# Patient Record
Sex: MALE | Race: WHITE | NOT HISPANIC OR LATINO | Employment: FULL TIME | URBAN - METROPOLITAN AREA
[De-identification: names, ages, dates, MRNs, and addresses within clinical notes are randomized per-mention and may not be internally consistent; named-entity substitution may affect disease eponyms.]

---

## 2018-06-04 ENCOUNTER — OFFICE VISIT (OUTPATIENT)
Dept: SLEEP CENTER | Facility: CLINIC | Age: 43
End: 2018-06-04
Payer: COMMERCIAL

## 2018-06-04 VITALS
SYSTOLIC BLOOD PRESSURE: 142 MMHG | HEIGHT: 72 IN | DIASTOLIC BLOOD PRESSURE: 80 MMHG | BODY MASS INDEX: 34.08 KG/M2 | WEIGHT: 251.6 LBS | HEART RATE: 70 BPM

## 2018-06-04 DIAGNOSIS — Z99.89 OBSTRUCTIVE SLEEP APNEA ON CPAP: Primary | ICD-10-CM

## 2018-06-04 DIAGNOSIS — E66.09 CLASS 1 OBESITY DUE TO EXCESS CALORIES WITH BODY MASS INDEX (BMI) OF 34.0 TO 34.9 IN ADULT, UNSPECIFIED WHETHER SERIOUS COMORBIDITY PRESENT: ICD-10-CM

## 2018-06-04 DIAGNOSIS — G47.33 OBSTRUCTIVE SLEEP APNEA ON CPAP: Primary | ICD-10-CM

## 2018-06-04 DIAGNOSIS — G47.26 SHIFT WORK SLEEP DISORDER: ICD-10-CM

## 2018-06-04 PROBLEM — E66.811 CLASS 1 OBESITY DUE TO EXCESS CALORIES WITH BODY MASS INDEX (BMI) OF 34.0 TO 34.9 IN ADULT: Status: ACTIVE | Noted: 2018-06-04

## 2018-06-04 PROCEDURE — 99214 OFFICE O/P EST MOD 30 MIN: CPT | Performed by: NURSE PRACTITIONER

## 2018-06-04 NOTE — PATIENT INSTRUCTIONS
1   Continue use of CPAP equipment nightly  2  Continue to clean your equipment as discussed  3  Contact the [facility] with any questions or concerns prior to her next visit as needed  4    Schedule visit for follow-up in 1 year

## 2018-06-04 NOTE — PROGRESS NOTES
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  His current schedule is night shift , however he recently accepted a new position on a strictly day shift schedule  Current Outpatient Prescriptions:     Cetirizine HCl (ZYRTEC ALLERGY PO), Take by mouth daily, Disp: , Rfl:     [place] Sleepiness Scale  Sitting and reading: Slight chance of dozing  Watching TV: Slight chance of dozing  Sitting, inactive in a public place (e g  a theatre or a meeting): Would never doze  As a passenger in a car for an hour without a break: Would never doze  Lying down to rest in the afternoon when circumstances permit: Moderate chance of dozing  Sitting and talking to someone: Would never doze  Sitting quietly after a lunch without alcohol: Slight chance of dozing  In a car, while stopped for a few minutes in traffic: Would never doze  Total score: 5              Vitals:    18 1100   BP: 142/80   Pulse: 70   Weight: 114 kg (251 lb 9 6 oz)   Height: 6' (1 829 m)       Body mass index is 34 12 kg/m²  Neck Circumference: 40 (cm)       EPWORTH SLEEPINESS SCORE  Total score: 5      Past History Since Last Sleep Center Visit:     He denies any changes to his health since his last visit  He is currently working straight night shift between the hours of 7pm-7am Wednesday through Saturday    He still finds it difficult on the transition days  He has recently accepted a new job position and will be working M-F dayshift  The review of systems and following portions of the patient's history were reviewed and updated as appropriate: allergies, current medications, past family history, past medical history, past social history, past surgical history, and problem list     OBJECTIVE    PAP Pressure: Nasal CPAP set to deliver 17 cm of water pressure  DME Provider: [employer] [facility]    Physical Exam:     General Appearance:   Alert, cooperative, no distress, appears stated age, obese     Head:   Normocephalic, without obvious abnormality, atraumatic     Eyes:   PERRL, conjunctiva/corneas clear, EOM's intact          Nose:  Nares normal, septum midline, no drainage or sinus tenderness           Throat:  Lips, teeth and gums normal; tongue normal size and  shape and midline mucosa moist and redundant bilaterally, uvula thick and normal in length, tonsils not visualized, Mallampati class 4       Neck:  Supple, symmetrical, trachea midline, no adenopathy; Thyroid: No enlargement, tenderness or nodules; no carotid bruit or JVD     Lungs:      Clear to auscultation bilaterally, respirations unlabored     Heart:   Regular rate and rhythm, S1 and S2 normal, no murmur, rub or gallop       Extremities:  Extremities normal, atraumatic, no cyanosis or edema     Pulses:  2+ and symmetric all extremities     Skin:  Skin color, texture, turgor normal, no rashes or lesions       Neurologic:  CNII-XII intact  Normal strength, sensation throughout       ASSESSMENT / PLAN    1  Obstructive sleep apnea on CPAP  PAP DME Resupply/Reorder   2  Shift work sleep disorder     3  Class 1 obesity due to excess calories with body mass index (BMI) of 34 0 to 34 9 in adult, unspecified whether serious comorbidity present             Counseling / Coordination of Care  Total clinic time spent today 25 minutes   Greater than 50% of total time was spent with the patient and / or family counseling and / or coordination of care  A description of the counseling / coordination of care:     Impressions, Prognosis, Instructions for management, Risks and benefits of treatment, Patient and family education, Risk factor reductions and Importance of compliance with treatment    There is not compliance data available due to the age of his equipment  We discussed that he would likely qualify for new equipment at this time, however, he does not want to receive any new equipment  Response to treatment has been good  He will continue using this equipment at the settings noted above for the next 12 months  At that timehe will then return for a routine follow-up evaluation  New supplies have been ordered or the next 12 months  I have asked him to contact the [facility] if he encounters any difficulties prior to that time  We discussed that lifestyle changes regarding diet and exercise would be helpful in improving his sleep apnea and overall health  He feels that with his new schedule, he may be better able to do this  We discussed that significant amounts of weight loss, such as 20-30 lbs, would likely decrease the amount of pressure he would need from his CPAP equipment  Pressure changes could be made, if needed  He will contact our office if he experiences this  The following instructions have been given to the patient today:    Patient Instructions   1  Continue use of CPAP equipment nightly  2  Continue to clean your equipment as discussed  3  Contact the [facility] with any questions or concerns prior to her next visit as needed  4    Schedule visit for follow-up in 1 year        Naveen Valentin, [address] [place] [address]

## 2019-06-05 ENCOUNTER — OFFICE VISIT (OUTPATIENT)
Dept: SLEEP CENTER | Facility: CLINIC | Age: 44
End: 2019-06-05
Payer: COMMERCIAL

## 2019-06-05 VITALS
HEART RATE: 64 BPM | WEIGHT: 281.4 LBS | HEIGHT: 72 IN | SYSTOLIC BLOOD PRESSURE: 146 MMHG | BODY MASS INDEX: 38.11 KG/M2 | DIASTOLIC BLOOD PRESSURE: 88 MMHG

## 2019-06-05 DIAGNOSIS — Z99.89 OBSTRUCTIVE SLEEP APNEA TREATED WITH CONTINUOUS POSITIVE AIRWAY PRESSURE (CPAP): Primary | ICD-10-CM

## 2019-06-05 DIAGNOSIS — G47.33 OBSTRUCTIVE SLEEP APNEA TREATED WITH CONTINUOUS POSITIVE AIRWAY PRESSURE (CPAP): Primary | ICD-10-CM

## 2019-06-05 PROBLEM — G47.26 SHIFT WORK SLEEP DISORDER: Status: RESOLVED | Noted: 2018-06-04 | Resolved: 2019-06-05

## 2019-06-05 PROCEDURE — 99214 OFFICE O/P EST MOD 30 MIN: CPT | Performed by: NURSE PRACTITIONER

## 2020-06-09 ENCOUNTER — OFFICE VISIT (OUTPATIENT)
Dept: SLEEP CENTER | Facility: CLINIC | Age: 45
End: 2020-06-09
Payer: COMMERCIAL

## 2020-06-09 VITALS
SYSTOLIC BLOOD PRESSURE: 130 MMHG | HEART RATE: 80 BPM | BODY MASS INDEX: 35.92 KG/M2 | WEIGHT: 265.2 LBS | HEIGHT: 72 IN | DIASTOLIC BLOOD PRESSURE: 76 MMHG

## 2020-06-09 DIAGNOSIS — G47.33 OBSTRUCTIVE SLEEP APNEA TREATED WITH CONTINUOUS POSITIVE AIRWAY PRESSURE (CPAP): Primary | ICD-10-CM

## 2020-06-09 DIAGNOSIS — Z99.89 OBSTRUCTIVE SLEEP APNEA TREATED WITH CONTINUOUS POSITIVE AIRWAY PRESSURE (CPAP): Primary | ICD-10-CM

## 2020-06-09 DIAGNOSIS — E66.09 CLASS 2 OBESITY DUE TO EXCESS CALORIES WITHOUT SERIOUS COMORBIDITY WITH BODY MASS INDEX (BMI) OF 35.0 TO 35.9 IN ADULT: ICD-10-CM

## 2020-06-09 DIAGNOSIS — F51.04 PSYCHOPHYSIOLOGICAL INSOMNIA: ICD-10-CM

## 2020-06-09 PROBLEM — E66.812 CLASS 2 OBESITY DUE TO EXCESS CALORIES WITH BODY MASS INDEX (BMI) OF 35.0 TO 35.9 IN ADULT: Status: ACTIVE | Noted: 2018-06-04

## 2020-06-09 PROCEDURE — 99214 OFFICE O/P EST MOD 30 MIN: CPT | Performed by: NURSE PRACTITIONER

## 2020-06-09 RX ORDER — BUPROPION HYDROCHLORIDE 150 MG/1
TABLET, EXTENDED RELEASE ORAL
COMMUNITY
Start: 2020-05-11

## 2020-06-09 RX ORDER — TRAZODONE HYDROCHLORIDE 50 MG/1
50 TABLET ORAL
Qty: 30 TABLET | Refills: 5 | Status: SHIPPED | OUTPATIENT
Start: 2020-06-09 | End: 2021-01-12 | Stop reason: SDUPTHER

## 2020-06-10 ENCOUNTER — TELEPHONE (OUTPATIENT)
Dept: SLEEP CENTER | Facility: CLINIC | Age: 45
End: 2020-06-10

## 2021-01-12 ENCOUNTER — OFFICE VISIT (OUTPATIENT)
Dept: SLEEP CENTER | Facility: CLINIC | Age: 46
End: 2021-01-12
Payer: COMMERCIAL

## 2021-01-12 VITALS
DIASTOLIC BLOOD PRESSURE: 80 MMHG | WEIGHT: 270.6 LBS | HEART RATE: 68 BPM | HEIGHT: 72 IN | BODY MASS INDEX: 36.65 KG/M2 | SYSTOLIC BLOOD PRESSURE: 122 MMHG

## 2021-01-12 DIAGNOSIS — Z99.89 OBSTRUCTIVE SLEEP APNEA TREATED WITH CONTINUOUS POSITIVE AIRWAY PRESSURE (CPAP): Primary | ICD-10-CM

## 2021-01-12 DIAGNOSIS — G47.33 OBSTRUCTIVE SLEEP APNEA TREATED WITH CONTINUOUS POSITIVE AIRWAY PRESSURE (CPAP): Primary | ICD-10-CM

## 2021-01-12 DIAGNOSIS — F51.04 PSYCHOPHYSIOLOGICAL INSOMNIA: ICD-10-CM

## 2021-01-12 PROCEDURE — 99214 OFFICE O/P EST MOD 30 MIN: CPT | Performed by: NURSE PRACTITIONER

## 2021-01-12 RX ORDER — LISINOPRIL AND HYDROCHLOROTHIAZIDE 12.5; 1 MG/1; MG/1
1 TABLET ORAL EVERY MORNING
COMMUNITY
Start: 2020-12-21

## 2021-01-12 RX ORDER — TRAZODONE HYDROCHLORIDE 50 MG/1
TABLET ORAL
Qty: 60 TABLET | Refills: 5 | Status: SHIPPED | OUTPATIENT
Start: 2021-01-12 | End: 2021-12-07

## 2021-01-12 RX ORDER — SILDENAFIL 50 MG/1
50 TABLET, FILM COATED ORAL DAILY
COMMUNITY
Start: 2020-10-28

## 2021-01-12 NOTE — PATIENT INSTRUCTIONS
1   Continue trazodone, may increase dose to 1 5-2 tablets, as needed  2  Continue use of CPAP equipment nightly  3  Continue to clean your equipment, as discussed  4  Contact the Sleep [address] [place] [address] with any questions or concerns prior to your next visit, as needed  5  Schedule visit for follow-up in 6 months      Nursing Support:  When: Monday through Friday 7A-5PM except holidays  Where: Our direct line is [phone]  If you are having a true emergency please call 911  In the event that the line is busy or it is after hours please leave a voice message and we will return your call  Please speak clearly, leaving your full name, birth date, best number to reach you and the reason for your call  Medication refills: We will need the name of the medication, the dosage, the ordering provider, whether you get a 30 or 90 day refill, and the pharmacy name and address  Medications will be ordered by the provider only  Nurses cannot call in prescriptions  Please allow 7 days for medication refills  Physician requested updates: If your provider requested that you call with an update after starting medication, please be ready to provide us the medication and dosage, what time you take your medication, the time you attempt to fall asleep, time you fall asleep, when you wake up, and what time you get out of bed  Sleep Study Results: We will contact you with sleep study results and/or next steps after the physician has reviewed your testing

## 2021-01-12 NOTE — PROGRESS NOTES
Progress Note - Isakramon Centeno 39 y o  male   :1975, MRN: [id number]  2021          Follow Up Evaluation / Problem:  Obstructive Sleep Apnea  Obesity    HPI: Ehsan Cat is a 39y o  year old male  He presents for follow up of obstructive sleep apnea  He was originally diagnosed in  and received his CPAP equipment at that time  He had a follow-up sleep study in  with an adjustment to his pressure  He is currently using his CPAP equipment nightly at 13 cm of water pressure  He sleeps well without frequent night time awakenings and feels refreshed upon awakening  He began the use of trazodone 50mg at bedtime, to help with sleep initiation insomnia  Review of Systems      Genitourinary none   Cardiology none   Gastrointestinal none   Neurology none   Constitutional none   Integumentary none   Psychiatry none   Musculoskeletal none   Pulmonary none   ENT none   Endocrine frequent urination   Hematological none         Current Outpatient Medications:     buPROPion (WELLBUTRIN SR) 150 mg 12 hr tablet, , Disp: , Rfl:     Cetirizine HCl (ZYRTEC ALLERGY PO), Take by mouth daily, Disp: , Rfl:     lisinopril-hydrochlorothiazide (PRINZIDE,ZESTORETIC) 10-12 5 MG per tablet, Take 1 tablet by mouth every morning, Disp: , Rfl:     sildenafil (VIAGRA) 50 MG tablet, Take 50 mg by mouth daily, Disp: , Rfl:     traZODone (DESYREL) 50 mg tablet, Take 1-2 tablets at bedtime for insomnia, Disp: 60 tablet, Rfl: 5    [place] Sleepiness Scale  Sitting and reading: Would never doze  Watching TV: Slight chance of dozing  Sitting, inactive in a public place (e g  a theatre or a meeting):  Would never doze  As a passenger in a car for an hour without a break: Would never doze  Lying down to rest in the afternoon when circumstances permit: Slight chance of dozing  Sitting and talking to someone: Would never doze  Sitting quietly after a lunch without alcohol: Would never doze  In a car, while stopped for a few minutes in traffic: Would never doze  Total score: 2              Vitals:    01/12/21 0739   BP: 122/80   Pulse: 68   Weight: 123 kg (270 lb 9 6 oz)   Height: 6' (1 829 m)       Body mass index is 36 7 kg/m²  EPWORTH SLEEPINESS SCORE  Total score: 2      Past History Since Last Sleep Center Visit:   He began the use of lisinopril to treat HTN, which is working well for him  He continues to have some difficulty with sleep initiation  Symptoms are more significant during the week, on work nights  He has been using trazodone 50mg  This helps approximately 50% of the time  On other nights, he may lie awake for up to an hour  He states that he uses his CPAP equipment nightly and feels he gets benefit from using it  He reports that he cleans his equipment appropriately and changes supplies regularly  He is not interested in replacing his CPAP equipment at this time  The review of systems and following portions of the patient's history were reviewed and updated as appropriate: allergies, current medications, past family history, past medical history, past social history, past surgical history, and problem list     OBJECTIVE    PAP Pressure: Nasal CPAP set to deliver 13 cm of water pressure   Full face mask  DME Provider: [employer] [facility]    Physical Exam:     General Appearance:   Alert, cooperative, no distress, appears stated age, obese     Head:   Normocephalic, without obvious abnormality, atraumatic     Eyes:   PERRL, conjunctiva/corneas clear, EOM's intact          Nose:  Nares normal, septum midline, no drainage or sinus tenderness           Throat:  Lips, teeth and gums normal; tongue normal size and  shape and midline mucosa moist and redundant bilaterally, uvula thick at the base, tonsils not visualized, Mallampati class 4       Neck:  Supple, symmetrical, trachea midline, no adenopathy;  Thyroid: No enlargement, tenderness or nodules; no carotid bruit or JVD     Lungs:      Clear to auscultation bilaterally, respirations unlabored     Heart:   Regular rate and rhythm, S1 and S2 normal, no murmur, rub or gallop       Extremities:  Extremities normal, atraumatic, no cyanosis or edema       Skin:  Skin color, texture, turgor normal, no rashes or lesions       Neurologic:  No focal deficits noted  Normal strength, sensation throughout       ASSESSMENT / PLAN    1  Obstructive sleep apnea treated with continuous positive airway pressure (CPAP)  PAP DME Resupply/Reorder   2  Psychophysiological insomnia  traZODone (DESYREL) 50 mg tablet       Counseling / Coordination of Care  Total clinic time spent today 30 minutes  Greater than 50% of total time was spent with the patient and / or family counseling and / or coordination of care  A description of the counseling / coordination of care:     Impressions, Diagnostic results, Prognosis, Instructions for management, Risks and benefits of treatment, Patient and family education, Risk factor reductions and Importance of compliance with treatment    Today, we reviewed the use of his CPAP equipment  His equipment is old and no longer transmits data, however, he took pictures of the screens on his equipment  Screens show that he used the equipment 177/180 days with average of 7 5 hour of use per night and  AHI of 0 9, which shows his equipment is providing appropriate treatment  We verified that he is eligible to receive new CPAP equipment, however, he has a large deductible he does not wish to meet at this time along with high monthly fees for the equipment  He feels his current equipment is functioning well and plans to keep using it  Supplies have been ordered for the next year  We discussed the use of a medication to help with insomnia he has on some nights  He will continue trazodone 50mg 20-30 minutes before bedtime    We discussed that if it is not effective, he may increase to 1 5 to 2 tablets at bedtime  He will call if the increased dose is not helping  We may consider switching medication to doxepin  He will schedule a 6 month follow up visit to reevaluate  He was instructed to call with any questions or concerns prior to next visit, as needed  The following instructions have been given to the patient today:    Patient Instructions   1  Continue trazodone, may increase dose to 1 5-2 tablets, as needed  2  Continue use of CPAP equipment nightly  3  Continue to clean your equipment, as discussed  4  Contact the Sleep [address] [place] [address] with any questions or concerns prior to your next visit, as needed  5  Schedule visit for follow-up in 6 months      Nursing Support:  When: Monday through Friday 7A-5PM except holidays  Where: Our direct line is [phone]  If you are having a true emergency please call 911  In the event that the line is busy or it is after hours please leave a voice message and we will return your call  Please speak clearly, leaving your full name, birth date, best number to reach you and the reason for your call  Medication refills: We will need the name of the medication, the dosage, the ordering provider, whether you get a 30 or 90 day refill, and the pharmacy name and address  Medications will be ordered by the provider only  Nurses cannot call in prescriptions  Please allow 7 days for medication refills  Physician requested updates: If your provider requested that you call with an update after starting medication, please be ready to provide us the medication and dosage, what time you take your medication, the time you attempt to fall asleep, time you fall asleep, when you wake up, and what time you get out of bed  Sleep Study Results: We will contact you with sleep study results and/or next steps after the physician has reviewed your testing        Jaylan Marks, [address] [place] [address]

## 2021-01-13 ENCOUNTER — TELEPHONE (OUTPATIENT)
Dept: SLEEP CENTER | Facility: CLINIC | Age: 46
End: 2021-01-13

## 2021-01-26 NOTE — PROGRESS NOTES
I supervised the Advanced Practitioner  I reviewed the Advanced Practitioner note and agree      Rajat Flores DO 01/26/21

## 2021-07-20 ENCOUNTER — OFFICE VISIT (OUTPATIENT)
Dept: SLEEP CENTER | Facility: CLINIC | Age: 46
End: 2021-07-20
Payer: COMMERCIAL

## 2021-07-20 VITALS
WEIGHT: 270 LBS | SYSTOLIC BLOOD PRESSURE: 120 MMHG | OXYGEN SATURATION: 98 % | HEART RATE: 73 BPM | BODY MASS INDEX: 36.57 KG/M2 | DIASTOLIC BLOOD PRESSURE: 82 MMHG | HEIGHT: 72 IN

## 2021-07-20 DIAGNOSIS — G47.33 OBSTRUCTIVE SLEEP APNEA TREATED WITH CONTINUOUS POSITIVE AIRWAY PRESSURE (CPAP): Primary | ICD-10-CM

## 2021-07-20 DIAGNOSIS — F51.04 PSYCHOPHYSIOLOGICAL INSOMNIA: ICD-10-CM

## 2021-07-20 DIAGNOSIS — Z99.89 OBSTRUCTIVE SLEEP APNEA TREATED WITH CONTINUOUS POSITIVE AIRWAY PRESSURE (CPAP): Primary | ICD-10-CM

## 2021-07-20 PROCEDURE — 99214 OFFICE O/P EST MOD 30 MIN: CPT | Performed by: NURSE PRACTITIONER

## 2021-07-20 NOTE — PATIENT INSTRUCTIONS
1   Continue use of CPAP equipment nightly  2  Continue to clean your equipment, as discussed  3  Continue trazodone 50-100mg, using the lowest effective dose  Call when refills are needed  3   Contact the [facility] with any questions or concerns prior to your next visit, as needed  4  Schedule visit for follow-up in 1 year      Continuous Positive Airway Pressure (CPAP) therapy was prescribed to you as a medical necessity and there are risks of discontinuing  use of the device, some of which may be long term  Symptoms you experienced before using CPAP may return such as snoring, apneas, excessive daytime sleepiness, hypertension, cardiac arrhythmias, risk of stroke, congestive heart-failure, exacerbation of COPD and potential respiratory failure  Ultimately, it is a personal decision for you to make if you continue use of an affected device or discontinue until a replacement is provided  Unfortunately, [employer] has not yet provided us with information about available devices  You can visit their website at www  [employer]/scr-update to register your device and to learn more about how [employer] plans to replace your device  You may also consider adding an in line antibacterial filter that you can find online    Nursing Support:  When: Monday through Friday 7A-5PM except holidays  Where: Our direct line is [phone]  If you are having a true emergency please call 911  In the event that the line is busy or it is after hours please leave a voice message and we will return your call  Please speak clearly, leaving your full name, birth date, best number to reach you and the reason for your call  Medication refills: We will need the name of the medication, the dosage, the ordering provider, whether you get a 30 or 90 day refill, and the pharmacy name and address  Medications will be ordered by the provider only  Nurses cannot call in prescriptions    Please allow 7 days for medication refills  Physician requested updates: If your provider requested that you call with an update after starting medication, please be ready to provide us the medication and dosage, what time you take your medication, the time you attempt to fall asleep, time you fall asleep, when you wake up, and what time you get out of bed  Sleep Study Results: We will contact you with sleep study results and/or next steps after the physician has reviewed your testing

## 2021-07-20 NOTE — PROGRESS NOTES
Progress Note - Lakshmi Wrightxochiltkeya 55 y o  male   :1975, MRN: [id number]  2021          Follow Up Evaluation / Problem:  Obstructive Sleep Apnea  Sleep initiation insomnia  Obesity      HPI: Trini Olivera is a 55y o  year old male  He presents for follow up of obstructive sleep apnea  He was originally diagnosed in  and received his CPAP equipment at that time  He has never had it replaced  He had a follow-up sleep study in  with an adjustment to his pressure  He is currently using his CPAP equipment nightly at 13 cm of water pressure  He sleeps well without frequent night time awakenings and feels refreshed upon awakening  He began the use of trazodone 50mg at bedtime, to help with sleep initiation insomnia  Review of Systems      Genitourinary none   Cardiology none   Gastrointestinal none   Neurology none   Constitutional none   Integumentary none   Psychiatry none   Musculoskeletal none   Pulmonary none   ENT ringing in ears   Endocrine none   Hematological none         Current Outpatient Medications:     buPROPion (WELLBUTRIN SR) 150 mg 12 hr tablet, , Disp: , Rfl:     Cetirizine HCl (ZYRTEC ALLERGY PO), Take by mouth daily, Disp: , Rfl:     lisinopril-hydrochlorothiazide (PRINZIDE,ZESTORETIC) 10-12 5 MG per tablet, Take 1 tablet by mouth every morning, Disp: , Rfl:     sildenafil (VIAGRA) 50 MG tablet, Take 50 mg by mouth daily, Disp: , Rfl:     traZODone (DESYREL) 50 mg tablet, Take 1-2 tablets at bedtime for insomnia, Disp: 60 tablet, Rfl: 5    [place] Sleepiness Scale  Sitting and reading: Slight chance of dozing  Watching TV: Would never doze  Sitting, inactive in a public place (e g  a theatre or a meeting):  Would never doze  As a passenger in a car for an hour without a break: Slight chance of dozing  Lying down to rest in the afternoon when circumstances permit: Slight chance of dozing  Sitting and talking to someone: Would never doze  Sitting quietly after a lunch without alcohol: Would never doze  In a car, while stopped for a few minutes in traffic: Would never doze  Total score: 3              Vitals:    07/20/21 0700   BP: 120/82   Pulse: 73   SpO2: 98%   Weight: 122 kg (270 lb)   Height: 6' (1 829 m)       Body mass index is 36 62 kg/m²  Neck Circumference: 15 7       EPWORTH SLEEPINESS SCORE  Total score: 3      Past History Since Last Sleep Center Visit:   He continues to have some difficulty with sleep initiation  Symptoms are more significant during the week, on work nights  He has been using trazodone 50-100mg  This helps to quiet his mind and allows him to initiate sleep in less than 20-30 minutes  He only uses it on nights when he feels he needs it  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, since the pandemic seems to be waning  He states that he uses his CPAP equipment nightly and feels he gets benefit from using it  He reports that he cleans his equipment appropriately using mild soap and water  He also uses vinegar/water solution throught the tubing as well  He changes supplies regularly  He is not interested in replacing his CPAP equipment at this time  He reports that he received information that his equipment is involved in the [employer] recall  The review of systems and following portions of the patient's history were reviewed and updated as appropriate: allergies, current medications, past family history, past medical history, past social history, past surgical history, and problem list     OBJECTIVE  Set up of equipment was at the start of treatment - 2011  PAP Pressure: Nasal CPAP set to deliver 13 cm of water pressure   Full face mask  DME Provider:  [facility]    Physical Exam:     General Appearance:   Alert, cooperative, no distress, appears stated age, obese     Head:   Normocephalic, without obvious abnormality, atraumatic     Eyes:   PERRL, conjunctiva/corneas clear, EOM's intact          Nose:  Nares normal, septum midline, no drainage or sinus tenderness           Throat:  Lips, teeth and gums normal; tongue normal size and  shape and midline mucosa moist and redundant bilaterally, uvula thick at the base and only partially visualized, tonsils not visualized, Mallampati class 4       Neck:  Supple, symmetrical, trachea midline, no adenopathy; Thyroid: No enlargement, tenderness or nodules; no carotid bruit or JVD     Lungs:      Clear to auscultation bilaterally, respirations unlabored     Heart:   Regular rate and rhythm, S1 and S2 normal, no murmur, rub or gallop       Extremities:  Extremities normal, atraumatic, no cyanosis or edema       Skin:  Skin color, texture, turgor normal, no rashes or lesions       Neurologic:  No focal deficits noted  Normal strength, sensation throughout       ASSESSMENT / PLAN    1  Obstructive sleep apnea treated with continuous positive airway pressure (CPAP)  PAP DME Resupply/Reorder   2  Psychophysiological insomnia         Counseling / Coordination of Care  Total clinic time spent today 30 minutes  Greater than 50% of total time was spent with the patient and / or family counseling and / or coordination of care  A description of the counseling / coordination of care:     Impressions, Diagnostic results, Prognosis, Instructions for management, Risks and benefits of treatment, Patient and family education, Risk factor reductions and Importance of compliance with treatment    Today, we reviewed the use of his CPAP equipment  His equipment is old and no longer transmits data, however, he took pictures of the screens on his equipment  Screens show that he used the equipment 170/180 days, including 169/180 for more than 4 hours  AHI of 0 9, which shows his equipment is providing appropriate treatment    We verified that he is eligible to receive new CPAP equipment, however, he has a large deductible he does not wish to meet at this time, along with high monthly fees for the equipment  He feels his current equipment is functioning well and plans to keep using it  Supplies have been ordered for the next year  We discussed the recent recall of the patient's PAP equipment  The risks and benefits for continued use vs  Discontinuation of PAP therapy were discussed  It is ultimately the patient's decision whether or not to continue PAP therapy at this time  The patient was advised to register their equipment on the [employer], which will give them further direction in the future replacement of the equipment  Since the ozone cleaning devices have been suspected as a causative agent in the recall, the patient has been advised to avoid using any ozone cleaning device and clean the equipment using mild soap and water  He reports that he has never used any ozone cleaning device with his equipment  We also discussed purchasing an in line antibacterial filter for his CPAP equipment until he hears more information from [employer] regarding the recall  He will continue trazodone 50-100mg 20-30 minutes before bedtime, using the lowest effective dose  He reports that he has only refilled the medication one time over the past 7 months  He will follow up in one year to reevaluate  He was instructed to call with any questions or concerns prior to next visit, as needed  The following instructions have been given to the patient today:    Patient Instructions   1  Continue use of CPAP equipment nightly  2  Continue to clean your equipment, as discussed  3  Continue trazodone 50-100mg, using the lowest effective dose  Call when refills are needed  3   Contact the [facility] with any questions or concerns prior to your next visit, as needed  4    Schedule visit for follow-up in 1 year      Continuous Positive Airway Pressure (CPAP) therapy was prescribed to you as a medical necessity and there are risks of discontinuing use of the device, some of which may be long term  Symptoms you experienced before using CPAP may return such as snoring, apneas, excessive daytime sleepiness, hypertension, cardiac arrhythmias, risk of stroke, congestive heart-failure, exacerbation of COPD and potential respiratory failure  Ultimately, it is a personal decision for you to make if you continue use of an affected device or discontinue until a replacement is provided  Unfortunately, [employer] has not yet provided us with information about available devices  You can visit their website at [email or web address] to register your device and to learn more about how [employer] plans to replace your device  You may also consider adding an in line antibacterial filter that you can find online    Nursing Support:  When: Monday through Friday 7A-5PM except holidays  Where: Our direct line is [phone]  If you are having a true emergency please call 911  In the event that the line is busy or it is after hours please leave a voice message and we will return your call  Please speak clearly, leaving your full name, birth date, best number to reach you and the reason for your call  Medication refills: We will need the name of the medication, the dosage, the ordering provider, whether you get a 30 or 90 day refill, and the pharmacy name and address  Medications will be ordered by the provider only  Nurses cannot call in prescriptions  Please allow 7 days for medication refills  Physician requested updates: If your provider requested that you call with an update after starting medication, please be ready to provide us the medication and dosage, what time you take your medication, the time you attempt to fall asleep, time you fall asleep, when you wake up, and what time you get out of bed  Sleep Study Results: We will contact you with sleep study results and/or next steps after the physician has reviewed your testing        Jarrell Ramirez [address] [facility]

## 2021-07-21 ENCOUNTER — TELEPHONE (OUTPATIENT)
Dept: SLEEP CENTER | Facility: CLINIC | Age: 46
End: 2021-07-21

## 2021-12-06 DIAGNOSIS — F51.04 PSYCHOPHYSIOLOGICAL INSOMNIA: ICD-10-CM

## 2021-12-07 DIAGNOSIS — F51.04 PSYCHOPHYSIOLOGICAL INSOMNIA: ICD-10-CM

## 2021-12-07 RX ORDER — TRAZODONE HYDROCHLORIDE 50 MG/1
TABLET ORAL
Qty: 60 TABLET | Refills: 5 | Status: SHIPPED | OUTPATIENT
Start: 2021-12-07 | End: 2022-06-08

## 2021-12-07 RX ORDER — TRAZODONE HYDROCHLORIDE 50 MG/1
TABLET ORAL
Qty: 60 TABLET | Refills: 5 | Status: SHIPPED | OUTPATIENT
Start: 2021-12-07 | End: 2021-12-07 | Stop reason: SDUPTHER

## 2022-07-20 ENCOUNTER — TELEPHONE (OUTPATIENT)
Dept: SLEEP CENTER | Facility: CLINIC | Age: 47
End: 2022-07-20

## 2022-07-22 ENCOUNTER — OFFICE VISIT (OUTPATIENT)
Dept: SLEEP CENTER | Facility: CLINIC | Age: 47
End: 2022-07-22
Payer: COMMERCIAL

## 2022-07-22 VITALS
WEIGHT: 266 LBS | HEART RATE: 73 BPM | SYSTOLIC BLOOD PRESSURE: 129 MMHG | BODY MASS INDEX: 36.03 KG/M2 | HEIGHT: 72 IN | DIASTOLIC BLOOD PRESSURE: 83 MMHG

## 2022-07-22 DIAGNOSIS — G47.33 OBSTRUCTIVE SLEEP APNEA TREATED WITH CONTINUOUS POSITIVE AIRWAY PRESSURE (CPAP): Primary | ICD-10-CM

## 2022-07-22 DIAGNOSIS — Z99.89 OBSTRUCTIVE SLEEP APNEA TREATED WITH CONTINUOUS POSITIVE AIRWAY PRESSURE (CPAP): Primary | ICD-10-CM

## 2022-07-22 DIAGNOSIS — F51.04 PSYCHOPHYSIOLOGICAL INSOMNIA: ICD-10-CM

## 2022-07-22 DIAGNOSIS — E66.09 CLASS 2 OBESITY DUE TO EXCESS CALORIES WITHOUT SERIOUS COMORBIDITY WITH BODY MASS INDEX (BMI) OF 36.0 TO 36.9 IN ADULT: ICD-10-CM

## 2022-07-22 PROCEDURE — 99214 OFFICE O/P EST MOD 30 MIN: CPT | Performed by: NURSE PRACTITIONER

## 2022-07-22 RX ORDER — BUPROPION HYDROCHLORIDE 150 MG/1
150 TABLET ORAL DAILY
COMMUNITY
Start: 2022-07-07

## 2022-07-22 RX ORDER — TRAZODONE HYDROCHLORIDE 50 MG/1
TABLET ORAL
Qty: 180 TABLET | Refills: 1 | Status: SHIPPED | OUTPATIENT
Start: 2022-07-22

## 2022-07-22 NOTE — PROGRESS NOTES
Progress Note - Isakramon Edwards Kylecarlos 52 y o  male   :1975, MRN: [id number]  2022      Follow Up Evaluation / Problem:  Obstructive Sleep Apnea  Sleep initiation insomnia  Obesity      HPI: Abelardo Dyer is a 52y o  year old male  He presents for follow up of obstructive sleep apnea  He was originally diagnosed in  and received his CPAP equipment at that time  He has never had it replaced  He had a follow-up sleep study in  with an adjustment to his pressure  He is currently using his CPAP equipment nightly at 13 cm of water pressure  He sleeps well without frequent night time awakenings and feels refreshed upon awakening  He began the use of trazodone 50mg at bedtime, to help with sleep initiation insomnia  Review of Systems      Genitourinary none   Cardiology none   Gastrointestinal none   Neurology none   Constitutional none   Integumentary none   Psychiatry none   Musculoskeletal none   Pulmonary frequent cough   ENT ringing in ears   Endocrine frequent urination   Hematological none       Current Outpatient Medications:     buPROPion (WELLBUTRIN XL) 150 mg 24 hr tablet, Take 150 mg by mouth daily, Disp: , Rfl:     Cetirizine HCl (ZYRTEC ALLERGY PO), Take by mouth daily, Disp: , Rfl:     lisinopril-hydrochlorothiazide (PRINZIDE,ZESTORETIC) 10-12 5 MG per tablet, Take 1 tablet by mouth every morning, Disp: , Rfl:     traZODone (DESYREL) 50 mg tablet, Take 1 to 2 tablets at bedtime as needed for insomnia, Disp: 180 tablet, Rfl: 1    buPROPion (WELLBUTRIN SR) 150 mg 12 hr tablet, , Disp: , Rfl:     sildenafil (VIAGRA) 50 MG tablet, Take 50 mg by mouth daily (Patient not taking: Reported on 2022), Disp: , Rfl:     [place] Sleepiness Scale  Sitting and reading: Would never doze  Watching TV: Slight chance of dozing  Sitting, inactive in a public place (e g  a theatre or a meeting):  Would never doze  As a passenger in a car for an hour without a break: Would never doze  Lying down to rest in the afternoon when circumstances permit: Moderate chance of dozing  Sitting and talking to someone: Would never doze  Sitting quietly after a lunch without alcohol: Slight chance of dozing  In a car, while stopped for a few minutes in traffic: Would never doze  Total score: 4              Vitals:    07/22/22 0742   BP: 129/83   BP Location: Left arm   Patient Position: Sitting   Cuff Size: Large   Pulse: 73   Weight: 121 kg (266 lb)   Height: 6' (1 829 m)       Body mass index is 36 08 kg/m²  EPWORTH SLEEPINESS SCORE  Total score: 4      Past History Since Last Sleep Center Visit:   He denies any significant changes to his health since his last visit  He has been using the CPAP equipment nightly  Data from screen shots of his equipment indicate usage  354/365 days of the past year  AHI is 0 8, including 0 7 OA, 0 0 CA  He feels he sleeps well when using the equipment and sleep is refreshing  He reports that he cleans the equipment and changes supplies regularly  He continues to work rotating shift work, working one week from 5:00pm-5:00am, 4 days of 5:00am-5:00pm and 2 weeks of 7:00am-4:00pm with one week off  He feels he sleeps well when sleeping during the daytime hours because the house is quiet  He takes trazodone 50mg daily at bedtime  He rarely takes 100mg  He is able to initiate sleep within 20-30 minutes and sleeps well        The review of systems and following portions of the patient's history were reviewed and updated as appropriate: allergies, current medications, past family history, past medical history, past social history, past surgical history, and problem list     OBJECTIVE  Set up of equipment was at the start of treatment - 2011  PAP Pressure: Nasal CPAP set to deliver 13 cm of water pressure   Full face mask  DME Provider: [employer]    Physical Exam:     General Appearance:   Alert, cooperative, no distress, appears stated age, obese     Head:   Normocephalic, without obvious abnormality, atraumatic     Eyes:   PERRL, conjunctiva/corneas clear, EOM's intact          Nose:  Nares normal, septum midline, no drainage or sinus tenderness           Throat:  Lips, teeth and gums normal; tongue normal size and midline mucosa moist and redundant bilaterally, uvula only partially visualized, tonsils not visualized, Mallampati class 4       Neck:  Supple, symmetrical, trachea midline, no adenopathy; Thyroid: No enlargement, tenderness or nodules; no carotid bruit or JVD     Lungs:      Clear to auscultation bilaterally, respirations unlabored     Heart:   Regular rate and rhythm, S1 and S2 normal, no murmur, rub or gallop       Extremities:  Extremities normal, atraumatic, no cyanosis or edema       Skin:  Skin color, texture, turgor normal, no rashes or lesions       Neurologic:  No focal deficits noted  ASSESSMENT / PLAN    1  Obstructive sleep apnea treated with continuous positive airway pressure (CPAP)  PAP DME Resupply/Reorder   2  Psychophysiological insomnia  traZODone (DESYREL) 50 mg tablet   3  Class 2 obesity due to excess calories without serious comorbidity with body mass index (BMI) of 36 0 to 36 9 in adult         Counseling / Coordination of Care  Total clinic time spent today 30 minutes  Greater than 50% of total time was spent with the patient and / or family counseling and / or coordination of care  A description of the counseling / coordination of care:     Impressions, Diagnostic results, Prognosis, Instructions for management, Risks and benefits of treatment, Patient and family education, Risk factor reductions and Importance of compliance with treatment    Today, we reviewed the use of his [employer] CPAP equipment  His equipment is old and no longer transmits data, however, he took pictures of the screens on his equipment  Screens show that he used the equipment 354/365 days    AHI of 0 8, which shows his equipment is providing excellent therapy  He feels his current equipment is functioning well and plans to keep using it  A prescription for supplies has been provided for the next year  He will continue trazodone 50-100mg 20-30 minutes before bedtime, using the lowest effective dose  He will follow up in one year to reevaluate  He was instructed to call with any questions or concerns prior to next visit, as needed  The following instructions have been given to the patient today:    Patient Instructions   1  Continue use of CPAP equipment nightly  2  Continue to clean your equipment, as discussed  2 5   continue use of trazodone 1-2 tablets at bedtime, as needed for insomnia  3  Contact the Sleep [address] [place] [address] with any questions or concerns prior to your next visit, as needed  4  Schedule visit for follow-up in 1 year       Nursing Support:  When: Monday through Friday 7A-5PM except holidays  Where: Our direct line is [phone]  If you are having a true emergency please call 911  In the event that the line is busy or it is after hours please leave a voice message and we will return your call  Please speak clearly, leaving your full name, birth date, best number to reach you and the reason for your call  Medication refills: We will need the name of the medication, the dosage, the ordering provider, whether you get a 30 or 90 day refill, and the pharmacy name and address  Medications will be ordered by the provider only  Nurses cannot call in prescriptions  Please allow 7 days for medication refills  Physician requested updates: If your provider requested that you call with an update after starting medication, please be ready to provide us the medication and dosage, what time you take your medication, the time you attempt to fall asleep, time you fall asleep, when you wake up, and what time you get out of bed  Sleep Study Results:  We will contact you with sleep study results and/or next steps after the physician has reviewed your testing        Jefry Anderson, [address] [facility]

## 2022-07-22 NOTE — PATIENT INSTRUCTIONS
1   Continue use of CPAP equipment nightly  2  Continue to clean your equipment, as discussed  2 5   continue use of trazodone 1-2 tablets at bedtime, as needed for insomnia  3  Contact the Sleep [address] [place] [address] with any questions or concerns prior to your next visit, as needed  4  Schedule visit for follow-up in 1 year       Nursing Support:  When: Monday through Friday 7A-5PM except holidays  Where: Our direct line is [phone]  If you are having a true emergency please call 911  In the event that the line is busy or it is after hours please leave a voice message and we will return your call  Please speak clearly, leaving your full name, birth date, best number to reach you and the reason for your call  Medication refills: We will need the name of the medication, the dosage, the ordering provider, whether you get a 30 or 90 day refill, and the pharmacy name and address  Medications will be ordered by the provider only  Nurses cannot call in prescriptions  Please allow 7 days for medication refills  Physician requested updates: If your provider requested that you call with an update after starting medication, please be ready to provide us the medication and dosage, what time you take your medication, the time you attempt to fall asleep, time you fall asleep, when you wake up, and what time you get out of bed  Sleep Study Results: We will contact you with sleep study results and/or next steps after the physician has reviewed your testing

## 2022-07-25 ENCOUNTER — TELEPHONE (OUTPATIENT)
Dept: SLEEP CENTER | Facility: CLINIC | Age: 47
End: 2022-07-25

## 2023-01-03 DIAGNOSIS — F51.04 PSYCHOPHYSIOLOGICAL INSOMNIA: ICD-10-CM

## 2023-01-03 RX ORDER — TRAZODONE HYDROCHLORIDE 50 MG/1
TABLET ORAL
Qty: 180 TABLET | Refills: 1 | Status: SHIPPED | OUTPATIENT
Start: 2023-01-03

## 2023-08-01 DIAGNOSIS — F51.04 PSYCHOPHYSIOLOGICAL INSOMNIA: ICD-10-CM

## 2023-08-04 RX ORDER — TRAZODONE HYDROCHLORIDE 50 MG/1
TABLET ORAL
Qty: 180 TABLET | Refills: 1 | Status: SHIPPED | OUTPATIENT
Start: 2023-08-04 | End: 2023-08-17 | Stop reason: DRUGHIGH

## 2023-08-17 ENCOUNTER — TELEPHONE (OUTPATIENT)
Dept: SLEEP CENTER | Facility: CLINIC | Age: 48
End: 2023-08-17

## 2023-08-17 ENCOUNTER — OFFICE VISIT (OUTPATIENT)
Dept: SLEEP CENTER | Facility: CLINIC | Age: 48
End: 2023-08-17

## 2023-08-17 VITALS
DIASTOLIC BLOOD PRESSURE: 76 MMHG | SYSTOLIC BLOOD PRESSURE: 120 MMHG | HEIGHT: 72 IN | BODY MASS INDEX: 36.11 KG/M2 | WEIGHT: 266.6 LBS

## 2023-08-17 DIAGNOSIS — G47.33 OBSTRUCTIVE SLEEP APNEA TREATED WITH CONTINUOUS POSITIVE AIRWAY PRESSURE (CPAP): Primary | ICD-10-CM

## 2023-08-17 DIAGNOSIS — F51.04 PSYCHOPHYSIOLOGICAL INSOMNIA: ICD-10-CM

## 2023-08-17 DIAGNOSIS — Z99.89 OBSTRUCTIVE SLEEP APNEA TREATED WITH CONTINUOUS POSITIVE AIRWAY PRESSURE (CPAP): Primary | ICD-10-CM

## 2023-08-17 RX ORDER — TRAZODONE HYDROCHLORIDE 100 MG/1
TABLET ORAL
Qty: 90 TABLET | Refills: 1 | Status: SHIPPED | OUTPATIENT
Start: 2023-08-17

## 2023-08-17 NOTE — Clinical Note
Patient would like to be set up with new CPAP at [facility]. Please call him to review details.

## 2023-08-17 NOTE — PROGRESS NOTES
Progress Note - 24 [facility] 50 y.o. male   :1975, MRN: [id number]  2023      Follow Up Evaluation / Problem:  Mild Obstructive Sleep Apnea  Sleep initiation insomnia  Obesity      HPI: Lainey Norton is a 50y.o. year old male. He presents for follow up of obstructive sleep apnea. He was originally diagnosed with mild NAREN in  and received his CPAP equipment at that time. He has never had it replaced. He had a follow-up therapeutic sleep study in , with an adjustment to his CPAP settings. He is currently using his CPAP equipment nightly at 13 cm of water pressure. He began to have difficulty initiating sleep and trazodone 50mg was started at bedtime. Current Outpatient Medications:   •  buPROPion (WELLBUTRIN XL) 150 mg 24 hr tablet, Take 150 mg by mouth daily, Disp: , Rfl:   •  Cetirizine HCl (ZYRTEC ALLERGY PO), Take by mouth daily, Disp: , Rfl:   •  lisinopril-hydrochlorothiazide (PRINZIDE,ZESTORETIC) 10-12.5 MG per tablet, Take 1 tablet by mouth every morning, Disp: , Rfl:   •  traZODone (DESYREL) 100 mg tablet, Take one tablet daily at bedtime for insomnia, Disp: 90 tablet, Rfl: 1    How likely are you to doze off or fall asleep in the following situations, in contrast to feeling just tired? Sitting and reading: Slight chance of dozing  Watching TV: Slight chance of dozing  Sitting, inactive in a public place (e.g. a theatre or a meeting):  Would never doze  As a passenger in a car for an hour without a break: Would never doze  Lying down to rest in the afternoon when circumstances permit: Moderate chance of dozing  Sitting and talking to someone: Would never doze  Sitting quietly after a lunch without alcohol: Slight chance of dozing  In a car, while stopped for a few minutes in traffic: Would never doze  Total score: 5              Vitals:    23 0914   BP: 120/76   BP Location: Left arm   Patient Position: Sitting Cuff Size: Adult   Weight: 121 kg (266 lb 9.6 oz)   Height: 6' (1.829 m)       Body mass index is 36.16 kg/m². EPWORTH SLEEPINESS SCORE  Total score: 5      Past History Since Last Sleep Center Visit:   He denies any significant changes to his health since his last visit. He has been using the CPAP equipment nightly. Data from screen shots of his equipment indicate usage  351/365 days of the past year. AHI is 0.9 with no air leaks. He feels he sleeps well when using the equipment and sleep is refreshing. He reports that he cleans the equipment and changes supplies regularly. He continues to work rotating shift work. Hours have slightly changed - he is now working one week from 4:30pm-4:30am, 4 days of 4:30am-4:30pm and 2 weeks of 7:00am-4:00pm with one week off. He takes trazodone 100mg daily at bedtime. He is able to initiate sleep within 20-30 minutes and feels he sleeps well with his CPAP. The review of systems and following portions of the patient's history were reviewed and updated as appropriate: allergies, current medications, past family history, past medical history, past social history, past surgical history, and problem list.    OBJECTIVE  Set up of equipment was at the start of treatment - 2011  PAP Pressure: Nasal CPAP set to deliver 13 cm of water pressure   Full face mask  DME Provider: [facility]    Physical Exam:     General Appearance:   Alert, cooperative, no distress, appears stated age, obese     Lungs:    Heart:  Clear to auscultation bilaterally, respirations unlabored      Regular rate and rhythm, S1 and S2 normal, no murmur, rub or gallop              ASSESSMENT / PLAN    1. Obstructive sleep apnea treated with continuous positive airway pressure (CPAP)  PAP DME Resupply/Reorder    Cpap New DME    CANCELED: Cpap New DME      2.  Psychophysiological insomnia  traZODone (DESYREL) 100 mg tablet          Counseling / Coordination of Care  I have spent a total time of 30 minutes on 08/17/23 in caring for this patient including Risks and benefits of tx options, Instructions for management, Patient and family education, Importance of tx compliance, Risk factor reductions, Impressions, Counseling / Coordination of care, Documenting in the medical record and Reviewing / ordering tests, medicine, procedures  . Today, we reviewed the use of his ResMed CPAP equipment. His equipment is old and no longer transmits data, however, he took pictures of the screens on his equipment. Screens show that he used the equipment 351/365 days. AHI of 0.9, which shows his equipment is providing excellent therapy. He feels his current equipment is functioning appropriately and plans to keep using it. A prescription for supplies has been provided for the next year. Due to the age of his equipment, he would like to consider getting new equipment. He is afraid his equipment may fail and he will not be able to sleep without it until he is able to have it replaced. A prescription for Resmed AirSense 11, set to 13cm of water pressure was provided. He will follow up for review of compliance with his new equipment in 31-91 days. He would like to change medical equipment [facility] to [facility].     He continues with sleep initiation insomnia. He has been taking 2 tablets of trazodone 50mg and is able to initiate sleep much more easily. He will continue trazodone 100mg 20-30 minutes before bedtime. He was instructed to call with any questions or concerns prior to next visit, as needed. The following instructions have been given to the patient today:    Patient Instructions   1. Schedule an appointment for set up of PAP equipment - patient plans to use [facility]  2. Begin using your equipment every night  3. Begin cleaning your equipment, as recommended   4. Contact your medical equipment distributor regarding any questions concerning your PAP equipment  5.   Contact the Sleep Disorders Center with any other questions, prior to next visit, if needed  6. Schedule compliance follow-up visit in 31-91 days, as required by insurance         Nursing Support:  When: Monday through Friday 7A-5PM except holidays  Where: Our direct line is [phone]. If you are having a true emergency please call 911. In the event that the line is busy or it is after hours please leave a voice message and we will return your call. Please speak clearly, leaving your full name, birth date, best number to reach you and the reason for your call. Medication refills: We will need the name of the medication, the dosage, the ordering provider, whether you get a 30 or 90 day refill, and the pharmacy name and address. Medications will be ordered by the provider only. Nurses cannot call in prescriptions. Please allow 7 days for medication refills. Physician requested updates: If your provider requested that you call with an update after starting medication, please be ready to provide us the medication and dosage, what time you take your medication, the time you attempt to fall asleep, time you fall asleep, when you wake up, and what time you get out of bed. Sleep Study Results: We will contact you with sleep study results and/or next steps after the physician has reviewed your testing.       Howard Sheets, [address]

## 2023-08-17 NOTE — PATIENT INSTRUCTIONS
1. Schedule an appointment for set up of PAP equipment - patient plans to use [facility]  2. Begin using your equipment every night  3. Begin cleaning your equipment, as recommended   4. Contact your medical equipment distributor regarding any questions concerning your PAP equipment  5. Contact the Sleep [address] [place] [address] with any other questions, prior to next visit, if needed  6. Schedule compliance follow-up visit in 31-91 days, as required by insurance         Nursing Support:  When: Monday through Friday 7A-5PM except holidays  Where: Our direct line is [phone]. If you are having a true emergency please call 911. In the event that the line is busy or it is after hours please leave a voice message and we will return your call. Please speak clearly, leaving your full name, birth date, best number to reach you and the reason for your call. Medication refills: We will need the name of the medication, the dosage, the ordering provider, whether you get a 30 or 90 day refill, and the pharmacy name and address. Medications will be ordered by the provider only. Nurses cannot call in prescriptions. Please allow 7 days for medication refills. Physician requested updates: If your provider requested that you call with an update after starting medication, please be ready to provide us the medication and dosage, what time you take your medication, the time you attempt to fall asleep, time you fall asleep, when you wake up, and what time you get out of bed. Sleep Study Results: We will contact you with sleep study results and/or next steps after the physician has reviewed your testing.

## 2023-08-18 LAB
DME PARACHUTE DELIVERY DATE REQUESTED: NORMAL
DME PARACHUTE DELIVERY DATE REQUESTED: NORMAL
DME PARACHUTE DELIVERY NOTE: NORMAL
DME PARACHUTE ITEM DESCRIPTION: NORMAL
DME PARACHUTE ORDER STATUS: NORMAL
DME PARACHUTE ORDER STATUS: NORMAL
DME PARACHUTE SUPPLIER NAME: NORMAL
DME PARACHUTE SUPPLIER NAME: NORMAL
DME PARACHUTE SUPPLIER PHONE: NORMAL
DME PARACHUTE SUPPLIER PHONE: NORMAL

## 2023-10-18 LAB

## 2023-10-19 ENCOUNTER — TELEPHONE (OUTPATIENT)
Dept: SLEEP CENTER | Facility: CLINIC | Age: 48
End: 2023-10-19

## 2023-10-19 NOTE — TELEPHONE ENCOUNTER
[facility] Form placed in [address] [facility] in [facility] to complete. Needs to be faxed back to Angélica Nicole.

## 2024-01-04 ENCOUNTER — TELEPHONE (OUTPATIENT)
Dept: SLEEP CENTER | Facility: CLINIC | Age: 49
End: 2024-01-04

## 2024-01-15 ENCOUNTER — OFFICE VISIT (OUTPATIENT)
Dept: SLEEP CENTER | Facility: CLINIC | Age: 49
End: 2024-01-15
Payer: COMMERCIAL

## 2024-01-15 VITALS
DIASTOLIC BLOOD PRESSURE: 79 MMHG | BODY MASS INDEX: 35.49 KG/M2 | HEIGHT: 72 IN | SYSTOLIC BLOOD PRESSURE: 123 MMHG | HEART RATE: 66 BPM | WEIGHT: 262 LBS

## 2024-01-15 DIAGNOSIS — F51.04 PSYCHOPHYSIOLOGICAL INSOMNIA: ICD-10-CM

## 2024-01-15 DIAGNOSIS — G47.26 SHIFT WORK SLEEP DISORDER: ICD-10-CM

## 2024-01-15 DIAGNOSIS — G47.33 OBSTRUCTIVE SLEEP APNEA TREATED WITH CONTINUOUS POSITIVE AIRWAY PRESSURE (CPAP): Primary | ICD-10-CM

## 2024-01-15 PROCEDURE — 99214 OFFICE O/P EST MOD 30 MIN: CPT | Performed by: PSYCHIATRY & NEUROLOGY

## 2024-01-15 RX ORDER — TRAZODONE HYDROCHLORIDE 100 MG/1
TABLET ORAL
Qty: 90 TABLET | Refills: 1 | Status: SHIPPED | OUTPATIENT
Start: 2024-01-15

## 2024-01-15 NOTE — PATIENT INSTRUCTIONS
It is very important to avoid driving while drowsy, this can be very dangerous or even cause serious injury or death.  If sleepy, it is not safe to get behind the wheel.  If you are driving and feels sleepy, it is very important to pull over right away.  Even losing control of the car for a split second can be deadly.  If you feel you cannot control when sleepiness occurs and cannot prevented, it is important to not drive at all until this improves.  Please let me know if you experience this as it is very important.     Nursing Support:  When: Monday through Friday 7A-5PM except holidays  Where: Our direct line is [phone].    If you are having a true emergency please call 911.  In the event that the line is busy or it is after hours please leave a voice message and we will return your call.  Please speak clearly, leaving your full name, birth date, best number to reach you and the reason for your call.   Medication refills: We will need the name of the medication, the dosage, the ordering provider, whether you get a 30 or 90 day refill, and the pharmacy name and address.  Medications will be ordered by the provider only.  Nurses cannot call in prescriptions.  Please allow 7 days for medication refills.  Physician requested updates: If your provider requested that you call with an update after starting medication, please be ready to provide us the medication and dosage, what time you take your medication, the time you attempt to fall asleep, time you fall asleep, when you wake up, and what time you get out of bed.  Sleep Study Results: We will contact you with sleep study results and/or next steps after the physician has reviewed your testing.

## 2024-01-15 NOTE — PROGRESS NOTES
Assessment/Plan:    1. Obstructive sleep apnea treated with continuous positive airway pressure (CPAP)  -     PAP DME Resupply/Reorder    2. Psychophysiological insomnia  -     traZODone (DESYREL) 100 mg tablet; Take one tablet daily at bedtime for insomnia    3. Shift work sleep disorder      We discussed that he is doing great, he is consistently using his CPAP machine, treatment is beneficial and effective.  No change is needed in his settings.  I wrote an order to renew his supply prescription.    He has a history of insomnia treated with trazodone, he tolerates this well without side effects.  I also renewed this.  In his chart, the diagnosis psychophysiologic insomnia is listed but he also is a shift worker with a rotating schedule.  Therefore, certainly shiftwork disorder could be another potential cause for insomnia in his case.  Regardless, he is doing well so he should continue his current therapy.    Subjective:      Patient ID: Braden Centeno is a 48 y.o. male.    HPI    This is a 48-year-old male who returns in a follow-up visit, he last saw Mary MCKEON in August 2023. He was originally diagnosed with mild NAREN in 2011 and received his CPAP equipment at that time.  He will has a history of insomnia as well, has been treated with trazodone.  At his last visit, the dose of 100 mg was continued.  In addition a new CPAP machine was ordered for him.  Medical history is notable for hypertension, allergies, obesity, and depression/anxiety.    Interval history    He works at a [employer] in a rotating schedule  Sat- Mon, 12 hour day shifts  430 am- 430 pm.    Tue/wed/thurs- off  Then 7 night shifts from 430 pm -430 am  Then off 3 days       When working overnight, he goes to bed at 6:30 AM, is asleep in 10 minutes, and is awake at 2 PM, sleeping 7 hours.  When working the day shift he goes to bed 830 pm and is up at 2 am.      When off work, he goes to bed at 11 PM, is asleep in 10 minutes, and awake at  9 AM.  He sleeps 9 hours a night.      Takes trazodone on a daily basis.  No side effects.  Does not feel medicated when he awakens.    Feels refreshed when he wakes. No drowsy driving. Able to stay awake at work    Daisy Sleepiness Scale score is 2.    CPAP data reviewed today  AirSense 11 set at 13 cm H2O  Average session 7 hours 28 minutes, usage 89 out of 90 days   AHI 0.8  95% mask leak slightly high.    No nasal congestion. No dry mouth. No air swallowing.     Caffeine- 24 oz coffee a day  Alcohol- socially    Daisy Sleepiness Scale  Sitting and reading: Slight chance of dozing  Watching TV: Would never doze  Sitting, inactive in a public place (e.g. a theatre or a meeting): Would never doze  As a passenger in a car for an hour without a break: Would never doze  Lying down to rest in the afternoon when circumstances permit: Slight chance of dozing  Sitting and talking to someone: Would never doze  Sitting quietly after a lunch without alcohol: Would never doze  In a car, while stopped for a few minutes in traffic: Would never doze  Total score: 2      Review of Systems    As above    Objective:      /79 (BP Location: Left arm, Patient Position: Sitting, Cuff Size: Large)   Pulse 66   Ht 6' (1.829 m)   Wt 119 kg (262 lb)   BMI 35.53 kg/m²          Physical Exam    RRR  Lungs CTA b/l

## 2024-01-16 ENCOUNTER — TELEPHONE (OUTPATIENT)
Dept: SLEEP CENTER | Facility: CLINIC | Age: 49
End: 2024-01-16

## 2024-01-17 LAB

## 2025-01-22 ENCOUNTER — OFFICE VISIT (OUTPATIENT)
Dept: SLEEP CENTER | Facility: CLINIC | Age: 50
End: 2025-01-22
Payer: COMMERCIAL

## 2025-01-22 VITALS
BODY MASS INDEX: 36.98 KG/M2 | HEIGHT: 72 IN | SYSTOLIC BLOOD PRESSURE: 121 MMHG | WEIGHT: 273 LBS | DIASTOLIC BLOOD PRESSURE: 78 MMHG

## 2025-01-22 DIAGNOSIS — G47.33 OBSTRUCTIVE SLEEP APNEA TREATED WITH CONTINUOUS POSITIVE AIRWAY PRESSURE (CPAP): ICD-10-CM

## 2025-01-22 DIAGNOSIS — F51.04 PSYCHOPHYSIOLOGICAL INSOMNIA: ICD-10-CM

## 2025-01-22 DIAGNOSIS — G47.26 SHIFT WORK SLEEP DISORDER: Primary | ICD-10-CM

## 2025-01-22 PROCEDURE — 99214 OFFICE O/P EST MOD 30 MIN: CPT | Performed by: PSYCHIATRY & NEUROLOGY

## 2025-01-22 RX ORDER — SILDENAFIL 50 MG/1
50 TABLET, FILM COATED ORAL DAILY
COMMUNITY
Start: 2025-01-02

## 2025-01-22 RX ORDER — TRAZODONE HYDROCHLORIDE 100 MG/1
TABLET ORAL
Qty: 90 TABLET | Refills: 3 | Status: SHIPPED | OUTPATIENT
Start: 2025-01-22

## 2025-01-22 NOTE — ASSESSMENT & PLAN NOTE
-History of mild obstructive sleep apnea, weight up since diagnosis so it is possible severity is worsened  -Continues to do great with CPAP, he is very consistent and his AHI is normal  -Just as before, he has a lot of mask leak but it does not seem to be compromising his therapy in any way.  Therefore we will observe this.  I looked back at his titration study 14 years ago.  Potentially he may do well with lower pressures, if needed but again his weight was less at that time.  For now we will keep him at 13 cm H2O  Orders:  •  PAP DME Resupply/Reorder

## 2025-01-22 NOTE — ASSESSMENT & PLAN NOTE
Orders:  •  traZODone (DESYREL) 100 mg tablet; Take one tablet daily at bedtime for insomnia   Alert/Awake

## 2025-01-22 NOTE — PROGRESS NOTES
Name: Braden Centeno      : 1975      MRN: [id number]  Encounter Provider: Chance Browne MD  Encounter Date: 2025   Encounter department: [facility]  :  Assessment & Plan  Obstructive sleep apnea treated with continuous positive airway pressure (CPAP)  -History of mild obstructive sleep apnea, weight up since diagnosis so it is possible severity is worsened  -Continues to do great with CPAP, he is very consistent and his AHI is normal  -Just as before, he has a lot of mask leak but it does not seem to be compromising his therapy in any way.  Therefore we will observe this.  I looked back at his titration study 14 years ago.  Potentially he may do well with lower pressures, if needed but again his weight was less at that time.  For now we will keep him at 13 cm H2O  Orders:  •  PAP DME Resupply/Reorder    Shift work sleep disorder  -Does well with shift work, keeps a consistent schedule for his rotating shifts as best he can  -Trazodone has been effective without side effects and therefore I will renew       Psychophysiological insomnia    Orders:  •  traZODone (DESYREL) 100 mg tablet; Take one tablet daily at bedtime for insomnia        History of Present Illness   HPI          This is a 49-year-old male who returns for a follow-up visit, he was last seen 1 year ago. He was originally diagnosed with mild NAREN in  and received his CPAP equipment at that time.  He will has a history of insomnia as well, has been treated with trazodone.     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  Sat- Mon, 12 hour day shifts  430 am- 430 pm.    Tue/wed/thurs- off  Then 7 night shifts from 430 pm -430 am  Then off 3 days     When working overnight, he goes to bed at 6:30 AM, is asleep in 10 minutes, and is awake at 2 PM, sleeping 7 hours.  When working the day shift he goes to bed 830 pm and is up at 2 am.       When off work,  Procedure:  [x] Radio Frequency Ablation (RFA)-Cooled Left Hip               Post Procedure Instructions:   Activity:  DO NOT work, drive a car, stay alone, or operate machinery or electrical/power tools or appliances today ONLY IF RECEIVING IV/ORAL SEDATION  Activity as tolerated.  Resume your pre-procedure activity or restrictions tomorrow.    Dressing/Incision Site:   Keep injection site clean and dry.  You may shower/bathe tomorrow.  You may use an ice pack at the injection site for the next 24 hours while awake.  The ice pack should only be used for 20 minutes every 2 hours.    Special Instructions:   DO NOT DRINK ALCOHOL TODAY due to the medication given during the procedure. ONLY IF RECEIVING IV/ORAL SEDATION  Resume your pre-procedure diet.  Continue your medications unless otherwise instructed.  You will most likely have continued pain after the procedure; continue your usual pain medications unless otherwise instructed.  STEROID INJECTIONS CAN TAKE UP TO 2 WEEKS TO WORK.    Call [phone] if you develop any of the following:  Drainage, increase in redness, and/or swelling from the injection site.  Temperature above 101oF.  Increased numbness or weakness of your legs or arms different than before the injection.  Severe headache.            Want to Say “Thank You” to a Nurse?  The ESTELLE Award® was created in memory of MIHIR Bassett by his family to say thank you to bedside nurses who provide an outstanding level of care.    Submit a nomination using any method below.     OR    [email or web address]  Or visit the Resource section   on your [employer] joelle              "he goes to bed at 11 PM, is asleep in 10 minutes, and awake at 9 AM.  He sleeps 9 hours a night.    Sleeping well, takes trazodone 100 mg before bed - no side effects such as dryness or dizziness    No sleepiness, no drowsy driving  No snoring, no nasal congestion, no nose bleeds    Caffeine- 2-3 cups coffee, on awakening  Alcohol- socially on weekends when off from work           CPAP data reviewed today  CPAP set at 13 cm H2O  AirSense 11 auto  Average session-7 hours 13 minutes, used 30 over 30 days  AHI 0.6  High mask leak is present.  High leak     Sitting and reading: (Patient-Rptd) (P) Slight chance of dozing  Watching TV: (Patient-Rptd) (P) Slight chance of dozing  Sitting, inactive in a public place (e.g. a theatre or a meeting): (Patient-Rptd) (P) Would never doze  As a passenger in a car for an hour without a break: (Patient-Rptd) (P) Would never doze  Lying down to rest in the afternoon when circumstances permit: (Patient-Rptd) (P) Moderate chance of dozing  Sitting and talking to someone: (Patient-Rptd) (P) Would never doze  Sitting quietly after a lunch without alcohol: (Patient-Rptd) (P) Slight chance of dozing  In a car, while stopped for a few minutes in traffic: (Patient-Rptd) (P) Would never doze  Total score: (Patient-Rptd) (P) 5     Review of Systems  Pertinent positives/negatives included in HPI and also as noted:       Objective   /78   Ht 6' (1.829 m)   Wt 124 kg (273 lb)   BMI 37.03 kg/m²        Physical Exam    RRR  Lungs CTA b/l  In NAD      Data  No results found for: \"HGB\", \"HCT\", \"MCV\"   No results found for: \"GLUCOSE\", \"CALCIUM\", \"NA\", \"K\", \"CO2\", \"CL\", \"BUN\", \"CREATININE\"  No results found for: \"IRON\", \"TIBC\", \"FERRITIN\"  No results found for: \"AST\", \"ALT\"      "

## 2025-01-23 ENCOUNTER — TELEPHONE (OUTPATIENT)
Dept: SLEEP CENTER | Facility: CLINIC | Age: 50
End: 2025-01-23

## 2025-01-24 LAB

## 2025-03-03 ENCOUNTER — TELEPHONE (OUTPATIENT)
Dept: SLEEP CENTER | Facility: CLINIC | Age: 50
End: 2025-03-03

## 2025-03-03 NOTE — TELEPHONE ENCOUNTER
Patient would like to see Dr. Browne to possibly start Zepbound.     I advised patient once our office had more concrete information as far as scheduling we could give him a call back.     Patient verbalized understanding.